# Patient Record
Sex: FEMALE | Race: WHITE
[De-identification: names, ages, dates, MRNs, and addresses within clinical notes are randomized per-mention and may not be internally consistent; named-entity substitution may affect disease eponyms.]

---

## 2019-10-22 ENCOUNTER — HOSPITAL ENCOUNTER (EMERGENCY)
Dept: HOSPITAL 46 - ED | Age: 54
Discharge: HOME | End: 2019-10-22
Payer: COMMERCIAL

## 2019-10-22 VITALS — WEIGHT: 130.01 LBS | HEIGHT: 63 IN | BODY MASS INDEX: 23.04 KG/M2

## 2019-10-22 DIAGNOSIS — R31.9: ICD-10-CM

## 2019-10-22 DIAGNOSIS — K21.9: Primary | ICD-10-CM

## 2019-10-22 DIAGNOSIS — Z79.899: ICD-10-CM

## 2019-10-22 PROCEDURE — C9113 INJ PANTOPRAZOLE SODIUM, VIA: HCPCS

## 2019-10-22 NOTE — EKG
[facility]
                                    [address]
                                  [place]  [address]
_________________________________________________________________________________________
                                                                 Signed   
 
 
Normal sinus rhythm
Normal ECG
No previous ECGs available
Confirmed by MATEO BUSH DO ([id number]) on 10/22/2019 4:33:25 PM
 
 
 
 
 
 
 
 
 
 
 
 
 
 
 
 
 
 
 
 
 
 
 
 
 
 
 
 
 
 
 
 
 
 
 
 
 
 
 
 
 
    Electronically Signed By: MATEO BUSH DO  10/22/19 1633
_________________________________________________________________________________________
PATIENT NAME:     KAMRAN BARKLEY ANCA                     
MEDICAL RECORD #: [id number]                     Electrocardiogram             
          ACCT #: [id number]  
DATE OF BIRTH:   [birth year]                                       
PHYSICIAN:   MATEO BUSH DO                     REPORT #: [id number]
REPORT IS CONFIDENTIAL AND NOT TO BE RELEASED WITHOUT AUTHORIZATION

## 2019-10-22 NOTE — XMS
Clinical Summary
  Created on: 10/22/2019
 
 Atif De Jesus
 External Reference #: [id number]
 : 63
 Sex: Female
 
 Demographics
 
 
+-----------------------+----------------------+
| Address               | [address]     |
|                       | [place]  [address] |
+-----------------------+----------------------+
| Home Phone            | [phone]      |
+-----------------------+----------------------+
| Preferred Language    | Unknown              |
+-----------------------+----------------------+
| Marital Status        |               |
+-----------------------+----------------------+
| Rastafari Affiliation | [id number]                 |
+-----------------------+----------------------+
| Race                  | Unknown              |
+-----------------------+----------------------+
| Ethnic Group          | Unknown              |
+-----------------------+----------------------+
 
 
 Author
 
 
+--------------+--------------------------------------------+
| Author       | [facility]  |
|              | and [place]                                |
+--------------+--------------------------------------------+
| Organization | [facility]  |
|              | and [place]                                |
+--------------+--------------------------------------------+
| Address      | Unknown                                    |
+--------------+--------------------------------------------+
| Phone        | Unavailable                                |
+--------------+--------------------------------------------+
 
 
 
 Support
 
 
+------------+--------------+---------+-----------------+
| Name       | Relationship | Address | Phone           |
+------------+--------------+---------+-----------------+
| Vincenzo De Jesus | YINA         | Unknown | [phone] |
+------------+--------------+---------+-----------------+
 
 
 
 Care Team Providers
 
 
 
+---------------------------+------+-----------------+
| Care Team Member Name     | Role | Phone           |
+---------------------------+------+-----------------+
| Ignacio Teresa MD | PCP  | [phone] |
+---------------------------+------+-----------------+
 
 
 
 Allergies
 Not on File
 
 Medications
 Not on file
 
 Active Problems
 Not on file
 
 Social History
 
 
+----------------+-------+-----------+--------+------+
| Tobacco Use    | Types | Packs/Day | Years  | Date |
|                |       |           | Used   |      |
+----------------+-------+-----------+--------+------+
| Never Assessed |       |           |        |      |
+----------------+-------+-----------+--------+------+
 
 
 
+------------------+---------------+
| Sex Assigned at  | Date Recorded |
| Birth            |               |
+------------------+---------------+
| Not on file      |               |
+------------------+---------------+
 
 
 
+----------------+-------------+-------------+
| Job Start Date | Occupation  | Industry    |
+----------------+-------------+-------------+
| Not on file    | Not on file | Not on file |
+----------------+-------------+-------------+
 
 
 
+----------------+--------------+------------+
| Travel History | Travel Start | Travel End |
+----------------+--------------+------------+
 
 
 
+-------------------------------------+
| No recent travel history available. |
+-------------------------------------+
 
 
 
 
 Last Filed Vital Signs
 Not on file
 
 Plan of Treatment
 
 
+---------------------+-----------+---------------------------------+----------+
| Health Maintenance  | Due Date  | Last Done                       | Comments |
+---------------------+-----------+---------------------------------+----------+
| Cervical Cancer     |  |                                 |          |
| Screening (Pap)     | 4         |                                 |          |
+---------------------+-----------+---------------------------------+----------+
| Vaccine: Zoster (1  |  |                                 |          |
| of 2)               | 4         |                                 |          |
+---------------------+-----------+---------------------------------+----------+
| Vaccine: Influenza  |  |                                 |          |
| (#1)                | 9         |                                 |          |
+---------------------+-----------+---------------------------------+----------+
| Breast Cancer       |  | 2019, 2018,         |          |
| Screening           | 1         | 2016, Additional history  |          |
|                     |           | exists                          |          |
+---------------------+-----------+---------------------------------+----------+
| Vaccine:            |  | 2013                      |          |
| Dtap/Tdap/Td (2 -   | 3         |                                 |          |
| Td)                 |           |                                 |          |
+---------------------+-----------+---------------------------------+----------+
 
 
 
 Results
 Not on filefrom Last 3 Months
 
 Insurance
 
 
+---------+--------+-------------+--------+-------------+---------+------+
| Payer   | Benefi | Subscriber  | Effect | Phone       | Address | Type |
|         | t Plan | ID          | [place]    |             |         |      |
|         |  /     |             | Dates  |             |         |      |
|         | Group  |             |        |             |         |      |
+---------+--------+-------------+--------+-------------+---------+------+
| [place] | [employer] | [id number] | 20 | [phone] |         | PPO  |
|         | E BCBS | 7           | 18-Pre | 8           |         |      |
|         |  [place]    |             | [place]   |             |         |      |
|         | PPO    |             |        |             |         |      |
+---------+--------+-------------+--------+-------------+---------+------+
 
 
 
+-------------------+--------+-------------+--------+-------------+----------------------+
| Guarantor Name    | Accoun | Relation to | Date   | Phone       | Billing Address      |
|                   | t Type |  Patient    | of     |             |                      |
|                   |        |             | Birth  |             |                      |
+-------------------+--------+-------------+--------+-------------+----------------------+
| Atif De Jesus | Person | Self        | / |             |   [address]   |
|                   | [address] |             | 1965   | [phone] | [place] [id number]  |
|                   | [place]    |             |        | 9 (Home)    |                      |
|                   |        |             |        | [phone] |                      |
|                   |        |             |        | 1 (Work)    |                      |
+-------------------+--------+-------------+--------+-------------+----------------------+
 
 
 
 
 Advance Directives
 Patient has advance care planning documents on file. For more information, please contact:[facility] [place] [address]

## 2019-10-22 NOTE — XMS
Clinical Summary
  Created on: 10/22/2019
 
 Atif De Jesus
 External Reference #: [id number]
 : 63
 Sex: Female
 
 Demographics
 
 
+-----------------------+----------------------+
| Address               | [address]     |
|                       | [place]  [address] |
+-----------------------+----------------------+
| Home Phone            | [phone]      |
+-----------------------+----------------------+
| Preferred Language    | Unknown              |
+-----------------------+----------------------+
| Marital Status        |               |
+-----------------------+----------------------+
| Church Affiliation | [id number]                 |
+-----------------------+----------------------+
| Race                  | Unknown              |
+-----------------------+----------------------+
| Ethnic Group          | Unknown              |
+-----------------------+----------------------+
 
 
 Author
 
 
+--------------+--------------------------------------------+
| Author       | [facility]  |
|              | and [place]                                |
+--------------+--------------------------------------------+
| Organization | [facility]  |
|              | and [place]                                |
+--------------+--------------------------------------------+
| Address      | Unknown                                    |
+--------------+--------------------------------------------+
| Phone        | Unavailable                                |
+--------------+--------------------------------------------+
 
 
 
 Support
 
 
+------------+--------------+---------+-----------------+
| Name       | Relationship | Address | Phone           |
+------------+--------------+---------+-----------------+
| Vincenzo De Jesus | YINA         | Unknown | [phone] |
+------------+--------------+---------+-----------------+
 
 
 
 Care Team Providers
 
 
 
+---------------------------+------+-----------------+
| Care Team Member Name     | Role | Phone           |
+---------------------------+------+-----------------+
| Ignacio Teresa MD | PCP  | [phone] |
+---------------------------+------+-----------------+
 
 
 
 Allergies
 Not on File
 
 Medications
 Not on file
 
 Active Problems
 Not on file
 
 Social History
 
 
+----------------+-------+-----------+--------+------+
| Tobacco Use    | Types | Packs/Day | Years  | Date |
|                |       |           | Used   |      |
+----------------+-------+-----------+--------+------+
| Never Assessed |       |           |        |      |
+----------------+-------+-----------+--------+------+
 
 
 
+------------------+---------------+
| Sex Assigned at  | Date Recorded |
| Birth            |               |
+------------------+---------------+
| Not on file      |               |
+------------------+---------------+
 
 
 
+----------------+-------------+-------------+
| Job Start Date | Occupation  | Industry    |
+----------------+-------------+-------------+
| Not on file    | Not on file | Not on file |
+----------------+-------------+-------------+
 
 
 
+----------------+--------------+------------+
| Travel History | Travel Start | Travel End |
+----------------+--------------+------------+
 
 
 
+-------------------------------------+
| No recent travel history available. |
+-------------------------------------+
 
 
 
 
 Last Filed Vital Signs
 Not on file
 
 Plan of Treatment
 
 
+---------------------+-----------+---------------------------------+----------+
| Health Maintenance  | Due Date  | Last Done                       | Comments |
+---------------------+-----------+---------------------------------+----------+
| Cervical Cancer     |  |                                 |          |
| Screening (Pap)     | 4         |                                 |          |
+---------------------+-----------+---------------------------------+----------+
| Vaccine: Zoster (1  |  |                                 |          |
| of 2)               | 4         |                                 |          |
+---------------------+-----------+---------------------------------+----------+
| Vaccine: Influenza  |  |                                 |          |
| (#1)                | 9         |                                 |          |
+---------------------+-----------+---------------------------------+----------+
| Breast Cancer       |  | 2019, 2018,         |          |
| Screening           | 1         | 2016, Additional history  |          |
|                     |           | exists                          |          |
+---------------------+-----------+---------------------------------+----------+
| Vaccine:            |  | 2013                      |          |
| Dtap/Tdap/Td (2 -   | 3         |                                 |          |
| Td)                 |           |                                 |          |
+---------------------+-----------+---------------------------------+----------+
 
 
 
 Results
 Not on filefrom Last 3 Months
 
 Insurance
 
 
+---------+--------+-------------+--------+-------------+---------+------+
| Payer   | Benefi | Subscriber  | Effect | Phone       | Address | Type |
|         | t Plan | ID          | [place]    |             |         |      |
|         |  /     |             | Dates  |             |         |      |
|         | Group  |             |        |             |         |      |
+---------+--------+-------------+--------+-------------+---------+------+
| [employer] | [employer] | [id number] | 20 | [phone] |         | PPO  |
|         | E BCBS | 7           | 18-Pre | 8           |         |      |
|         |  [place]    |             | sent   |             |         |      |
|         | PPO    |             |        |             |         |      |
+---------+--------+-------------+--------+-------------+---------+------+
 
 
 
+-------------------+--------+-------------+--------+-------------+----------------------+
| Guarantor Name    | Accoun | Relation to | Date   | Phone       | Billing Address      |
|                   | t Type |  Patient    | of     |             |                      |
|                   |        |             | Birth  |             |                      |
+-------------------+--------+-------------+--------+-------------+----------------------+
| Atif De Jesus | Person | Self        | / |             |   [address]   |
|                   | [address] |             | 1965   | [phone] | [place] [phone]  |
|                   | [place]    |             |        | 9 (Home)    |                      |
|                   |        |             |        | [phone] |                      |
|                   |        |             |        | 1 (Work)    |                      |
+-------------------+--------+-------------+--------+-------------+----------------------+
 
 
 
 
 Advance Directives
 Patient has advance care planning documents on file. For more information, please contact:[facility] [place] [address]

## 2020-03-19 ENCOUNTER — HOSPITAL ENCOUNTER (OUTPATIENT)
Dept: HOSPITAL 46 - OPS | Age: 55
Discharge: HOME | End: 2020-03-19
Attending: SURGERY
Payer: COMMERCIAL

## 2020-03-19 VITALS — HEIGHT: 63 IN | BODY MASS INDEX: 21.09 KG/M2 | WEIGHT: 119.01 LBS

## 2020-03-19 DIAGNOSIS — K29.50: ICD-10-CM

## 2020-03-19 DIAGNOSIS — R00.0: ICD-10-CM

## 2020-03-19 DIAGNOSIS — K21.0: Primary | ICD-10-CM

## 2020-03-19 PROCEDURE — 0DB98ZX EXCISION OF DUODENUM, VIA NATURAL OR ARTIFICIAL OPENING ENDOSCOPIC, DIAGNOSTIC: ICD-10-PCS | Performed by: SURGERY

## 2020-03-19 PROCEDURE — 0DB38ZX EXCISION OF LOWER ESOPHAGUS, VIA NATURAL OR ARTIFICIAL OPENING ENDOSCOPIC, DIAGNOSTIC: ICD-10-PCS | Performed by: SURGERY

## 2020-03-19 PROCEDURE — 0DB28ZX EXCISION OF MIDDLE ESOPHAGUS, VIA NATURAL OR ARTIFICIAL OPENING ENDOSCOPIC, DIAGNOSTIC: ICD-10-PCS | Performed by: SURGERY

## 2020-03-19 PROCEDURE — G0500 MOD SEDAT ENDO SERVICE >5YRS: HCPCS

## 2020-03-19 PROCEDURE — 0DB78ZX EXCISION OF STOMACH, PYLORUS, VIA NATURAL OR ARTIFICIAL OPENING ENDOSCOPIC, DIAGNOSTIC: ICD-10-PCS | Performed by: SURGERY

## 2020-03-19 NOTE — NUR
03/19/20 0851 Piedad Anderson
9661-PATIENT ARRIVED TO PACU ON 2L NC AWAKE LAYING LEFT LATERAL.
ABDOMEN SOFT. DENIES PAIN OR NAUSEA. ENCOURAGED TO REPOSITION SELF AS
WOULD LIKE. IVF INFUSING. PATINT DROWSY DOZES TO SLEEP. RR EVEN.

## 2020-03-20 NOTE — OR
[facility]
                                    [address], [place]  [address]
_________________________________________________________________________________________
                                                                 Signed   
 
 
DATE OF OPERATION:
03/19/2020
 
SURGEON:
Radha Coleman MD
 
PREOPERATIVE DIAGNOSIS:
Clinical gastroesophageal reflux improved with elevated head of bed at night.
 
POSTOPERATIVE DIAGNOSES:
1. Mildly dilated GE junction.  No sign of large hiatal hernia.
2. Relatively normal-appearing esophagus.
 
PROCEDURE:
Esophagogastroduodenoscopy with biopsy.
 
ANESTHESIA:
Intravenous sedation, fentanyl 100 mcg, Versed 3 mg.
 
INDICATION:
This 54-year-old white woman is a patient of Dr. Moise and has had worsening
gastroesophageal reflux symptoms despite a trial of omeprazole, which she is not
currently taking.  She will have episodes of significant regurgitation of enteric
contents back in her throat, worsened by lying flat, markedly improved by head of bed
elevation.  She does not have spontaneous regurgitation when bending over with a full
stomach, however.  She has had some minor swallowing difficulty sometimes in the
cervical esophagus noting a "bubble."  She has been taking omeprazole 40 mg daily, which
was of little clinical benefit.  She discontinued on the basis of its minimal effect.
She has no family history of esophageal cancer.  Does have a probable PSVT
tachyarrhythmia, which is well controlled with a beta blocker.  She is admitted at this
time to undergo upper endoscopy to better characterize the problem, specifically to
assess for hiatal hernia, etc.  She understands the risks of bleeding, infection, and
perforation, and wished to proceed. 
 
FINDINGS:
The esophagus, stomach and duodenum were relatively normal overall.  The GE junction had
minor defect, but no large hiatal hernia by any means.  The esophageal mucosa was not
ulcerated.  There was no sign of Thomas's epithelium.  CLOtest was -20 minutes
postprocedure. 
 
DESCRIPTION OF PROCEDURE:
The patient was brought to the endoscopy suite, given topical lidocaine spray
 
    Electronically Signed By: RADHA COLEMAN MD  03/20/20 1550
_________________________________________________________________________________________
PATIENT NAME:     KAMRAN BARKLEY                     
MEDICAL RECORD #: [id number]            OPERATIVE REPORT              
          ACCT #: [id number]  
DATE OF BIRTH:   [birth year]            REPORT #: [id number]      
PHYSICIAN:        RADHA COLEMAN MD                 
PCP:              BEAN GONZÁLES MD      
REPORT IS CONFIDENTIAL AND NOT TO BE RELEASED WITHOUT AUTHORIZATION
 
 
                                  [facility]
                                    [address], [place]  [address]
_________________________________________________________________________________________
                                                                 Signed   
 
 
anesthetic, placed in lateral decubitus position.  She was given intravenous sedation to
a point of slurred speech and nystagmus.  Full cardiopulmonary monitoring was
maintained.  An Olympus video upper endoscope was passed in the hypopharynx.  The vocal
cords appeared normal as did the surrounding soft tissue.  The scope was advanced to the
esophagus.  Throughout its length, it looked reasonably normal including the distal
portion overall.  The scope was advanced to the stomach, which was insufflated with air
with a small amount of bilious fluid.  The scope was advanced to the pylorus, which was
normal.  Scope was passed through into the duodenum, which was normal.  Biopsies were
taken of the duodenum to assess for celiac disease.  The scope was withdrawn.  The
pylorus appeared normal.  Scope was withdrawn.  The antrum was biopsied for both MAR and
pathologic testing.  Retroflexed view was undertaken showing a relatively intact flap
valve but it could be reverted with retroflex withdrawal in the J position.  The scope
was withdrawn to the distal esophagus and biopsies were obtained there as well as the
mid esophagus.  Further withdrawal showed no findings of concern.  The patient was taken
to recovery room in good condition. 
 
CONCLUDING DIAGNOSIS:
She may have clinical reflux without histologic or endoscopic evidence of it.  I would
have her continue with the PPI medication, Prilosec for the moment, and we will see her
back in 6 weeks when the nationwide pandemic is hopefully at [place].  She will continue
with physical measures of head of bed elevation in the meantime. 
 
 
 
            ________________________________________
            MD LEONA Walker/MODL
Job #:  [id number]
DD:  03/19/2020 08:53:54
DT:  03/19/2020 13:25:36
 
cc:            MD Rodrick Monzon MD
 
 
Copies:  BEAN GONZÁLSE MD
~
 
 
 
    Electronically Signed By: RADHA COLEMAN MD  03/20/20 1550
_________________________________________________________________________________________
PATIENT NAME:     KAMRAN BARKLEY                     
MEDICAL RECORD #: [id number]            OPERATIVE REPORT              
          ACCT #: [id number]  
DATE OF BIRTH:   [birth year]            REPORT #: [id number]      
PHYSICIAN:        RADHA COLEMAN MD                 
PCP:              BEAN GONZÁLES MD      
REPORT IS CONFIDENTIAL AND NOT TO BE RELEASED WITHOUT AUTHORIZATION

## 2020-03-20 NOTE — PATH
[facility]
                                    [address], [place]  [address]
_________________________________________________________________________________________
                                                                 Signed   
 
 
 
SPECIMEN(S): A DUODENUM
SPECIMEN(S): B ANTRUM/PYLORUS
SPECIMEN(S): C DISTAL ESOPHAGUS
SPECIMEN(S): D MID ESOPHAGUS
 
SPECIMEN SOURCE:
A. DUODENUM
B. ANTRUM/PYLORUS
C. DISTAL ESOPHAGUS
D. MID ESOPHAGUS
 
CLINICAL HISTORY:
EGD.   GERD with esophagitis.   Postop:  Minimal hiatal hernia.
MICROSCOPIC DESCRIPTION:
Histologic sections of all submitted blocks are examined by light microscopy. 
These findings, together with the gross examination, support the pathologic 
diagnosis. 
 
FINAL PATHOLOGIC DIAGNOSIS:
A. Duodenum, biopsy:
-  Duodenal mucosa with no histopathologic abnormality.
-  Negative for dysplasia or malignancy.
B.  Stomach, antrum/pylorus, biopsy:
-  Antral mucosa with mild chronic, inactive gastritis.
-  Negative for Helicobacter organisms on HE stain.
-  Negative for dysplasia or malignancy.
C.  Esophagus, distal, biopsy:
-  Squamous mucosa with minimal chronic inflammation.
-  Negative for intestinal metaplasia, dysplasia, or malignancy.
D.  Esophagus, mid, biopsy:
-  Squamous mucosa with no histopathologic abnormality.
-  Negative for intestinal metaplasia, dysplasia, or malignancy.
NAL:cml:C2NR
 
GROSS DESCRIPTION:
Four specimens are received in four containers, labeled "VM."
A.  The specimen, labeled "VM, 1," and designated on the requisition 
"duodenum," is received in formalin and consists of two tan soft tissue 
fragments that measure 0.3 cm in greatest dimension. The 
specimen is entirely submitted in cassette (A1).
B.  The specimen, labeled "VM, 2," and designated on the requisition 
 
                                                                                    
_________________________________________________________________________________________
PATIENT NAME:     KAMRAN BARKLEY                     
MEDICAL RECORD #: [id number]            PATHOLOGY                     
          ACCT #: [id number]       ACCESSION #: [id number]     
DATE OF BIRTH:   [birth year]            REPORT #: [id number]       
PHYSICIAN:        ZULMA FONTANEZ              
PCP:              BEAN GONZÁLES MD      
REPORT IS CONFIDENTIAL AND NOT TO BE RELEASED WITHOUT AUTHORIZATION
 
 
                                  [facility]
                                    [address], [place]  [address]
_________________________________________________________________________________________
                                                                 Signed   
 
 
"antrum/pylorus," is received in formalin and consists of two tan soft tissue 
fragments that measure 0.3 cm in greatest dimension. 
The specimen is entirely submitted in cassette (B1).
C.  The specimen, labeled "VM, 3," and designated on the requisition "distal 
esophagus," is received in formalin and consists of multiple tan soft tissue 
fragments that measure 0.4 cm in greatest 
dimension. The specimen is entirely submitted in cassette (C1).
 
D.  The specimen, labeled "VM, 4," and designated on the requisition "mid 
esophagus," is received in formalin and consists of a single tan soft tissue 
fragment that measure 0.3 cm in greatest 
dimension. The specimen is entirely submitted in cassette (D1).
AT(under the direct supervision of a pathologist)
The Gross Description was prepared using a voice recognition system.  The 
report was reviewed for accuracy; however, sound-alike word errors, addition 
and/or deletions may occur.  If there is any 
question about this report, please contact Client Services.
 
PERFORMING LABORATORY:
The technical component was performed by [facility] [address] (Medical Director: Kinjal Infante MD; CLIA# [id number]). 
[facility] [address] [address] (CLIA# [id number]). 
 
Diagnostician:  Simran Ureña MD
Pathologist
Electronically Signed 03/20/2020
 
 
Copies:                                
~
 
 
 
 
 
 
 
 
 
 
 
                                                                                    
_________________________________________________________________________________________
PATIENT NAME:     KAMRAN BARKLEY ANCA                     
MEDICAL RECORD #: [id number]            PATHOLOGY                     
          ACCT #: [id number]       ACCESSION #: [id number]     
DATE OF BIRTH:   [birth year]            REPORT #: [id number]       
PHYSICIAN:        ZULMA PATHOLOGY              
PCP:              BEAN GONZÁLES MD      
REPORT IS CONFIDENTIAL AND NOT TO BE RELEASED WITHOUT AUTHORIZATION